# Patient Record
Sex: FEMALE | Race: WHITE | URBAN - METROPOLITAN AREA
[De-identification: names, ages, dates, MRNs, and addresses within clinical notes are randomized per-mention and may not be internally consistent; named-entity substitution may affect disease eponyms.]

---

## 2017-03-21 ENCOUNTER — IMPORTED ENCOUNTER (OUTPATIENT)
Dept: URBAN - METROPOLITAN AREA CLINIC 1 | Facility: CLINIC | Age: 67
End: 2017-03-21

## 2017-03-21 PROBLEM — H25.813: Noted: 2017-03-21

## 2017-03-21 PROBLEM — H04.123: Noted: 2017-03-21

## 2017-03-21 PROBLEM — E11.9: Noted: 2017-03-21

## 2017-03-21 PROBLEM — H16.143: Noted: 2017-03-21

## 2017-03-21 PROCEDURE — 92015 DETERMINE REFRACTIVE STATE: CPT

## 2017-03-21 PROCEDURE — 92014 COMPRE OPH EXAM EST PT 1/>: CPT

## 2017-03-21 NOTE — PATIENT DISCUSSION
1.  DM Type II without sign of diabetic retinopathy and no blot heme on dilated retinal examination today OU No Macular Edema: Stable. Discussed the pathophysiology of diabetes and its effect on the eye and risk of blindness. Stressed the importance of strong glucose control. Advised of importance of at least yearly dilated examinations but to contact us immediately for any problems or concerns. 2. Cataract OU: Observe for now without intervention. The patient was advised to contact us if any change or worsening of vision3. JOSELUIS w/ PEK OU- The increase of artificial tears OU to TID were recommended. 4.  H/o allergic conjunctivitis OU- Controlled on Zaditor OU PRN. 5. Return for an appointment for a 27 in 1 year with Dr. Sanjay Jay.

## 2018-03-27 ENCOUNTER — IMPORTED ENCOUNTER (OUTPATIENT)
Dept: URBAN - METROPOLITAN AREA CLINIC 1 | Facility: CLINIC | Age: 68
End: 2018-03-27

## 2018-03-27 PROBLEM — H04.123: Noted: 2018-03-27

## 2018-03-27 PROBLEM — R73.09: Noted: 2018-03-27

## 2018-03-27 PROBLEM — H25.813: Noted: 2018-03-27

## 2018-03-27 PROBLEM — H16.143: Noted: 2018-03-27

## 2018-03-27 PROBLEM — H43.811: Noted: 2018-03-27

## 2018-03-27 PROCEDURE — 92014 COMPRE OPH EXAM EST PT 1/>: CPT

## 2018-03-27 NOTE — PATIENT DISCUSSION
1.  DM Type II (Diet Controlled) without sign of diabetic retinopathy and no blot heme on dilated retinal examination today OU No Macular Edema:  Discussed the pathophysiology of diabetes and its effect on the eye and risk of blindness. Stressed the importance of strong glucose control. Advised of importance of at least yearly dilated examinations but to contact us immediately for any problems or concerns. 2. JOSELUIS w/ PEK OU- Restart ATs TID OU Routinely. 3.  PVD w/o Tear OD - RD precautions. 4.  Cataract OU: Observe for now without intervention. The patient was advised to contact us if any change or worsening of vision5. H/o Allergic Conjunctivitis- controlled on Zaditor BID OU. Letter to [address] 53 for an appointment in 1 yr 27 with Dr. Benny Mcclure.

## 2019-03-28 ENCOUNTER — IMPORTED ENCOUNTER (OUTPATIENT)
Dept: URBAN - METROPOLITAN AREA CLINIC 1 | Facility: CLINIC | Age: 69
End: 2019-03-28

## 2019-03-28 PROBLEM — R73.09: Noted: 2019-03-28

## 2019-03-28 PROBLEM — H16.143: Noted: 2019-03-28

## 2019-03-28 PROBLEM — H04.123: Noted: 2019-03-28

## 2019-03-28 PROCEDURE — 92014 COMPRE OPH EXAM EST PT 1/>: CPT

## 2020-03-26 ENCOUNTER — IMPORTED ENCOUNTER (OUTPATIENT)
Dept: URBAN - METROPOLITAN AREA CLINIC 1 | Facility: CLINIC | Age: 70
End: 2020-03-26

## 2020-03-26 PROBLEM — R73.09: Noted: 2020-03-26

## 2020-03-26 PROBLEM — H25.813: Noted: 2020-03-26

## 2020-03-26 PROBLEM — H16.143: Noted: 2020-03-26

## 2020-03-26 PROBLEM — H04.123: Noted: 2020-03-26

## 2020-03-26 PROCEDURE — 92015 DETERMINE REFRACTIVE STATE: CPT

## 2020-03-26 PROCEDURE — 92014 COMPRE OPH EXAM EST PT 1/>: CPT

## 2020-03-26 NOTE — PATIENT DISCUSSION
1.  DM Type II (Diet Controlled) without sign of diabetic retinopathy and no blot heme on dilated retinal examination today OU No Macular Edema:  Discussed the pathophysiology of diabetes and its effect on the eye and risk of blindness. Stressed the importance of strong glucose control. Advised of importance of at least yearly dilated examinations but to contact us immediately for any problems or concerns. 2. JOSELUIS w/ PEK OU -- Increase ATs to QID OU Routinely. 3.  PVD w/o Tear OU -- RD precautions. 4.  Cataract OU -- Observe for now without intervention. The patient was advised to contact us if any change or worsening of vision5. H/o Allergic Conjunctivitis - Controlled on Zaditor BID OU. Finalized [facility] MRx today. Return for an appointment in 1 year for a 30/glare with Dr. Laly Gustafson.

## 2020-05-22 ENCOUNTER — IMPORTED ENCOUNTER (OUTPATIENT)
Dept: URBAN - METROPOLITAN AREA CLINIC 1 | Facility: CLINIC | Age: 70
End: 2020-05-22

## 2020-05-22 PROBLEM — G51.0: Noted: 2020-05-22

## 2020-05-22 PROBLEM — H16.143: Noted: 2020-05-22

## 2020-05-22 PROBLEM — H04.123: Noted: 2020-05-22

## 2020-05-22 PROCEDURE — 92012 INTRM OPH EXAM EST PATIENT: CPT

## 2020-05-22 NOTE — PATIENT DISCUSSION
1.  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 Patient also c/o facial and tongue neuropathy symptoms. Patient to follow up with PCP. Consider Neurology Evaluation. 2. JOSELUIS w/ PEK OU-Use preservative free Tears q1H ou and OTC nola qhs ou. Ok to patch while sleeping. 3. Return for an appointment for 1 week 10 with Dr. Cally Cronin.

## 2020-05-22 NOTE — PATIENT DISCUSSION
Bell's Palsy OS -- the patient has signs and symptoms consistent with a right Bell's palsy. The condition and possible etiologies were discussed with the patient.

## 2020-05-29 ENCOUNTER — IMPORTED ENCOUNTER (OUTPATIENT)
Dept: URBAN - METROPOLITAN AREA CLINIC 1 | Facility: CLINIC | Age: 70
End: 2020-05-29

## 2020-05-29 PROBLEM — H04.123: Noted: 2020-05-29

## 2020-05-29 PROBLEM — H16.143: Noted: 2020-05-29

## 2020-05-29 PROBLEM — G51.0: Noted: 2020-05-29

## 2020-05-29 PROCEDURE — 92012 INTRM OPH EXAM EST PATIENT: CPT

## 2020-05-29 NOTE — PATIENT DISCUSSION
1.  Mild [place] Palsy OS -- Lid function improved over past week. Exposure keratopathy improved over past week with ATs q1H. Cont ATs q1H OU. Neurological work-up pending including MRI. 2. JOSELUIS w/ PEK OU -- Cont preservative free Tears q1H OU and OTC nola qhs OU. Ok to patch while sleeping. Return for an appointment in 1 month 10/ k check with Dr. Denise Martinez.

## 2020-06-29 ENCOUNTER — IMPORTED ENCOUNTER (OUTPATIENT)
Dept: URBAN - METROPOLITAN AREA CLINIC 1 | Facility: CLINIC | Age: 70
End: 2020-06-29

## 2020-06-29 PROCEDURE — 92012 INTRM OPH EXAM EST PATIENT: CPT

## 2020-06-29 NOTE — PATIENT DISCUSSION
1.  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 PEK improved with ATs q1H. Cont ATs q1H OS and nola QHS. MRI results pending. 2.  JOSELUIS w/ PEK OU -- Cont preservative free Tears routinely and OTC nola qhs OU. 3.  Cataract OU -- Observe for now without intervention. The patient was advised to contact us if any change or worsening of vision4. Allergic Conjunctivitis - Controlled on Zaditor BID OU. 5.  PVD w/o Tear OU -- RD precautions. 6.  H/o DM Type II (Diet Controlled) w/o Crystal for an appointment in As scheduled March with Dr. Tara Warren.

## 2021-03-26 ENCOUNTER — IMPORTED ENCOUNTER (OUTPATIENT)
Dept: URBAN - METROPOLITAN AREA CLINIC 1 | Facility: CLINIC | Age: 71
End: 2021-03-26

## 2021-03-26 PROBLEM — H25.813: Noted: 2021-03-26

## 2021-03-26 PROBLEM — R73.09: Noted: 2021-03-26

## 2021-03-26 PROBLEM — H16.143: Noted: 2021-03-26

## 2021-03-26 PROBLEM — H04.123: Noted: 2021-03-26

## 2021-03-26 PROCEDURE — 99214 OFFICE O/P EST MOD 30 MIN: CPT

## 2021-03-26 NOTE — PATIENT DISCUSSION
1.  DM Type II (Diet Controlled) without sign of diabetic retinopathy and no blot heme on dilated retinal examination today OU No Macular Edema:  Discussed the pathophysiology of diabetes and its effect on the eye and risk of blindness. Stressed the importance of strong glucose control. Advised of importance of at least yearly dilated examinations but to contact us immediately for any problems or concerns. 2. Cataract OU -- Observe for now without intervention. The patient was advised to contact us if any change or worsening of vision. 3. JOSELUIS w/ PEK OU -- Recommend ATs TID OU routinely and OTC nola QHS OU (handout given). 4.  Allergic Conjunctivitis - Controlled on Zaditor BID OU. 5.  PVD w/o Tear OU -- RD precautions. 6.  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 Cont ATs Q1Hr OS and nola QHS. Patient deferred Man[facility] Rx today. Return for an appointment in 6 months 10/k check/glare with Dr. Sara Meza.

## 2021-03-26 NOTE — PATIENT DISCUSSION
Cataract OU: Observe for now without intervention.  The patient was advised to contact us if any change or worsening of vision No

## 2021-05-14 ENCOUNTER — IMPORTED ENCOUNTER (OUTPATIENT)
Dept: URBAN - METROPOLITAN AREA CLINIC 1 | Facility: CLINIC | Age: 71
End: 2021-05-14

## 2021-05-14 PROBLEM — H25.813: Noted: 2021-05-14

## 2021-05-14 PROBLEM — R73.09: Noted: 2021-05-14

## 2021-05-14 PROCEDURE — 99214 OFFICE O/P EST MOD 30 MIN: CPT

## 2021-05-14 PROCEDURE — 92015 DETERMINE REFRACTIVE STATE: CPT

## 2021-05-14 NOTE — PATIENT DISCUSSION
1.  Cataract OU:  Visually Significant secondary to glare discussed the risks benefits alternatives and limitations of cataract surgery. The patient stated a full understanding and a desire to proceed with the procedure. The patient will need to return for preop appointment with cataract measurements and to have any additional questions answered and start pre-operative eye drops as directed. Phaco PCL OS then OD Otherwise follow-up as scheduled 2. DM Type II (Diet Controlled) without sign of diabetic retinopathy and no blot heme on dilated retinal examination today OU No Macular Edema:  Discussed the pathophysiology of diabetes and its effect on the eye and risk of blindness. Stressed the importance of strong glucose control. Advised of importance of at least yearly dilated examinations but to contact us immediately for any problems or concerns. 3. JOSELUIS w/ PEK OU - Begin Restasis BID OU (erx). Recommend ATs TID OU routinely and OTC nola QHS OU4. Allergic Conjunctivitis - Controlled on Zaditor BID OU. 5.  PVD w/o Tear OU - RD precautions. 6.  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 Return for an appointment for Ascan/H and P. with Dr. Rodney Oneill.

## 2021-05-28 ENCOUNTER — IMPORTED ENCOUNTER (OUTPATIENT)
Dept: URBAN - METROPOLITAN AREA CLINIC 1 | Facility: CLINIC | Age: 71
End: 2021-05-28

## 2021-05-28 PROBLEM — H25.812: Noted: 2021-05-28

## 2021-05-28 PROCEDURE — 92136 OPHTHALMIC BIOMETRY: CPT

## 2021-05-28 NOTE — PATIENT DISCUSSION
1. Cataract OS -- Visually Significant secondary to glare discussed the risks benefits alternatives and limitations of cataract surgery. The patient stated a full understanding and a desire to proceed with the procedure. Discussed with patient if PO Gtts are more than $120 for all three combined when filling at their Pharmacy please call our office to request generic substitutions. Pt came to pre-op appointment today alone and Lifestyle Questionnaire Completed. Pt understands they will need glasses post-op to achieve their best corrected vision.  Phaco PCL OS

## 2021-06-02 ENCOUNTER — IMPORTED ENCOUNTER (OUTPATIENT)
Dept: URBAN - METROPOLITAN AREA CLINIC 1 | Facility: CLINIC | Age: 71
End: 2021-06-02

## 2021-06-03 ENCOUNTER — IMPORTED ENCOUNTER (OUTPATIENT)
Dept: URBAN - METROPOLITAN AREA CLINIC 1 | Facility: CLINIC | Age: 71
End: 2021-06-03

## 2021-06-03 PROBLEM — Z96.1: Noted: 2021-06-03

## 2021-06-03 PROCEDURE — 99024 POSTOP FOLLOW-UP VISIT: CPT

## 2021-06-03 NOTE — PATIENT DISCUSSION
POD#1 CE/IOL Standard OS doing well. (Tatiana Hidalgo in place) Use Prednisolone BID OS Prolensa Qdaily OS Ocuflox TID OS: Use all three gtts through completion of PO gtt chart regimen/ Per our instructions given to patient.   Post op Warnings Reiterated RTC as scheduled

## 2021-06-11 ENCOUNTER — IMPORTED ENCOUNTER (OUTPATIENT)
Dept: URBAN - METROPOLITAN AREA CLINIC 1 | Facility: CLINIC | Age: 71
End: 2021-06-11

## 2021-06-11 PROBLEM — H25.811: Noted: 2021-06-11

## 2021-06-11 PROCEDURE — 92136 OPHTHALMIC BIOMETRY: CPT

## 2021-06-11 NOTE — PATIENT DISCUSSION
1.  Cataract OD: Visually Significant secondary to glare discussed the risks benefits alternatives and limitations of cataract surgery. The patient stated a full understanding and a desire to proceed with the procedure. Discussed with patient if PO Gtts are more than $120 for all three combined when filling at their Pharmacy please call our office to request generic substitutions. Pt understands they will need glasses post-op to achieve their best corrected vision. Phaco PCL OD 2. POW#3  CE/IOL Standard OS doing well. *Dextenza in place  Use Prednisolone BID OS && Prolensa Qdaily OS: Use through completion of po gtt regimen.  F/u as scheduled 2nd eye

## 2021-06-18 ENCOUNTER — IMPORTED ENCOUNTER (OUTPATIENT)
Dept: URBAN - METROPOLITAN AREA CLINIC 1 | Facility: CLINIC | Age: 71
End: 2021-06-18

## 2021-06-19 ENCOUNTER — IMPORTED ENCOUNTER (OUTPATIENT)
Dept: URBAN - METROPOLITAN AREA CLINIC 1 | Facility: CLINIC | Age: 71
End: 2021-06-19

## 2021-06-19 PROBLEM — Z96.1: Noted: 2021-06-19

## 2021-06-19 PROCEDURE — 99024 POSTOP FOLLOW-UP VISIT: CPT

## 2021-06-19 NOTE — PATIENT DISCUSSION
1. POD#1 Phaco/ PCL Standard OD- doing well. Dextenza Use Prednisolone BID OD Prolensa Qdaily OD Ocuflox TID OD : Use all three gtts through completion of PO gtt chart regimen/ Per our instructions given. Post op Warnings Reiterated 2. POW#3 Phaco/ PCL Standard OS- doing well. Dextenza  Use Prednisolone BID OS && Prolensa Qdaily OS: Use through completion of po gtt regimen.  RTC as scheduled

## 2021-07-13 ENCOUNTER — IMPORTED ENCOUNTER (OUTPATIENT)
Dept: URBAN - METROPOLITAN AREA CLINIC 1 | Facility: CLINIC | Age: 71
End: 2021-07-13

## 2021-07-13 PROBLEM — Z96.1: Noted: 2021-07-13

## 2021-07-13 PROCEDURE — 99024 POSTOP FOLLOW-UP VISIT: CPT

## 2021-07-13 NOTE — PATIENT DISCUSSION
POM#1 CE/IOL OU (Standard OU) doing well. *Dextenza in place  Use Prednisolone BID OD & Prolensa Qdaily OD: Use through completion of po gtt regimen. MRX for glasses given. Return for an appointment in February 30 with Dr. Laly Gustafson.

## 2021-11-29 ENCOUNTER — IMPORTED ENCOUNTER (OUTPATIENT)
Dept: URBAN - METROPOLITAN AREA CLINIC 1 | Facility: CLINIC | Age: 71
End: 2021-11-29

## 2021-11-29 PROBLEM — H43.813: Noted: 2021-11-29

## 2021-11-29 PROBLEM — H16.143: Noted: 2021-11-29

## 2021-11-29 PROBLEM — E11.9: Noted: 2021-11-29

## 2021-11-29 PROBLEM — H04.123: Noted: 2021-11-29

## 2021-11-29 PROBLEM — Z79.84: Noted: 2021-11-29

## 2021-11-29 PROCEDURE — 99214 OFFICE O/P EST MOD 30 MIN: CPT

## 2021-11-29 PROCEDURE — 92015 DETERMINE REFRACTIVE STATE: CPT

## 2021-11-29 NOTE — PATIENT DISCUSSION
1.  DM Type II (oral meds) without sign of diabetic retinopathy and no blot heme on dilated retinal examination today OU No Macular Edema:  Discussed the pathophysiology of diabetes and its effect on the eye and risk of blindness. Stressed the importance of strong glucose control. Advised of importance of at least yearly dilated examinations but to contact us immediately for any problems or concerns. Explained to PT that the vision changes could be due to PT recently starting Metformin and her BS decreasing. 2. JOSELUIS w/ PEK OU - Recommend the use of ATs TID OU routinely. Cont Restasis BID OU. 3. PVD w/o Tear OU - stable/observe. MRX given. Return for an appointment for Return as scheduled with Dr. Connor Benítez.

## 2022-02-15 ENCOUNTER — COMPREHENSIVE EXAM (OUTPATIENT)
Dept: URBAN - METROPOLITAN AREA CLINIC 1 | Facility: CLINIC | Age: 72
End: 2022-02-15

## 2022-02-15 DIAGNOSIS — Z96.1: ICD-10-CM

## 2022-02-15 DIAGNOSIS — E11.9: ICD-10-CM

## 2022-02-15 DIAGNOSIS — H16.143: ICD-10-CM

## 2022-02-15 DIAGNOSIS — H26.493: ICD-10-CM

## 2022-02-15 DIAGNOSIS — H04.123: ICD-10-CM

## 2022-02-15 DIAGNOSIS — H43.813: ICD-10-CM

## 2022-02-15 PROCEDURE — 92015 DETERMINE REFRACTIVE STATE: CPT

## 2022-02-15 PROCEDURE — 92014 COMPRE OPH EXAM EST PT 1/>: CPT

## 2022-02-15 RX ORDER — CYCLOSPORINE 0.5 MG/ML: 1 EMULSION OPHTHALMIC TWICE A DAY

## 2022-02-15 ASSESSMENT — TONOMETRY
OS_IOP_MMHG: 12
OD_IOP_MMHG: 12

## 2022-02-15 ASSESSMENT — VISUAL ACUITY
OD_BAT: 20/60
OS_BAT: 20/60
OD_SC: 20/30
OS_SC: 20/25

## 2022-03-04 ENCOUNTER — CLINIC PROCEDURE ONLY (OUTPATIENT)
Dept: URBAN - METROPOLITAN AREA CLINIC 1 | Facility: CLINIC | Age: 72
End: 2022-03-04

## 2022-03-04 DIAGNOSIS — H26.491: ICD-10-CM

## 2022-03-04 PROCEDURE — 66821 AFTER CATARACT LASER SURGERY: CPT

## 2022-03-04 NOTE — PROCEDURE NOTE: CLINICAL
PROCEDURE NOTE: YAG Capsulotomy OD. Diagnosis: Posterior Capsular Opacity. Anesthesia: Topical. The purpose and nature of the procedure, possible alternative methods of treatment, the risks involved and the possibility of complications were discussed with patient. The Patient wishes to proceed and the consent was signed. 1 gtt Prolensa applied. The laser was then performed under topical anesthesia with no complications. Post op instructions were given to patient as well as a follow-up appointment. Patient was advised to call our office if any questions or concerns. Valente Estrella

## 2022-03-18 ENCOUNTER — CLINIC PROCEDURE ONLY (OUTPATIENT)
Dept: URBAN - METROPOLITAN AREA CLINIC 1 | Facility: CLINIC | Age: 72
End: 2022-03-18

## 2022-03-18 DIAGNOSIS — H26.492: ICD-10-CM

## 2022-03-18 PROCEDURE — 66821 AFTER CATARACT LASER SURGERY: CPT

## 2022-03-18 NOTE — PROCEDURE NOTE: CLINICAL
PROCEDURE NOTE: YAG Capsulotomy OS. Diagnosis: Other Secondary Cataract. Anesthesia: Topical. The purpose and nature of the procedure, possible alternative methods of treatment, the risks involved and the possibility of complications were discussed with patient. The Patient wishes to proceed and the consent was signed. 1 gtt Prolensa applied. The laser was then performed under topical anesthesia with no complications. Post op instructions were given to patient as well as a follow-up appointment. Patient was advised to call our office if any questions or concerns. Letha Palencia

## 2022-04-08 ASSESSMENT — VISUAL ACUITY
OD_PH: SC 20/30
OS_CC: 20/30
OD_CC: 20/20-2
OS_GLARE: 20/40
OS_CC: 20/25
OD_CC: 20/150
OS_CC: 20/20
OD_PH: SC 20/20
OS_CC: 20/20-1
OD_CC: 20/25-1
OD_CC: 20/20
OS_CC: 20/20
OD_CC: 20/100-2
OS_CC: 20/60
OS_CC: 20/25
OS_GLARE: 20/200
OD_GLARE: 20/40
OS_CC: 20/20-1
OS_GLARE: 20/40
OS_CC: 20/20
OS_SC: 20/20
OS_CC: 20/25
OS_SC: 20/30
OD_CC: 20/400
OD_CC: 20/30
OD_CC: 20/30
OD_GLARE: 20/400
OD_CC: 20/50
OD_CC: 20/50-1
OD_GLARE: 20/400
OS_CC: 20/25
OS_CC: 20/25
OD_CC: 20/50-2
OD_SC: 20/20
OS_CC: 20/20
OD_CC: 20/30
OD_GLARE: 20/50
OD_CC: 20/20
OS_CC: 20/30-2
OD_SC: 20/30

## 2022-04-08 ASSESSMENT — TONOMETRY
OD_IOP_MMHG: 14
OS_IOP_MMHG: 13
OD_IOP_MMHG: 15
OD_IOP_MMHG: 14
OS_IOP_MMHG: 14
OS_IOP_MMHG: 15
OD_IOP_MMHG: 14
OD_IOP_MMHG: 14
OS_IOP_MMHG: 15
OS_IOP_MMHG: 14
OD_IOP_MMHG: 15
OS_IOP_MMHG: 13
OS_IOP_MMHG: 14
OD_IOP_MMHG: 14
OS_IOP_MMHG: 14
OD_IOP_MMHG: 16
OD_IOP_MMHG: 13
OD_IOP_MMHG: 15
OD_IOP_MMHG: 13
OS_IOP_MMHG: 16
OS_IOP_MMHG: 15
OS_IOP_MMHG: 13
OS_IOP_MMHG: 16
OD_IOP_MMHG: 16
OS_IOP_MMHG: 15
OD_IOP_MMHG: 15
OS_IOP_MMHG: 13

## 2022-04-08 ASSESSMENT — KERATOMETRY
OD_K1POWER_DIOPTERS: 43.00
OS_K1POWER_DIOPTERS: 43.50
OD_AXISANGLE_DEGREES: 094
OD_AXISANGLE2_DEGREES: 004
OS_K2POWER_DIOPTERS: 44.25
OS_AXISANGLE_DEGREES: 056
OS_AXISANGLE2_DEGREES: 146
OD_K2POWER_DIOPTERS: 43.75

## 2022-04-29 ENCOUNTER — POST-OP (OUTPATIENT)
Dept: URBAN - METROPOLITAN AREA CLINIC 1 | Facility: CLINIC | Age: 72
End: 2022-04-29

## 2022-04-29 DIAGNOSIS — Z98.890: ICD-10-CM

## 2022-04-29 PROCEDURE — 99024 POSTOP FOLLOW-UP VISIT: CPT

## 2022-04-29 ASSESSMENT — VISUAL ACUITY
OS_SC: 20/30 +1
OD_SC: 20/25 -1

## 2022-04-29 ASSESSMENT — TONOMETRY
OS_IOP_MMHG: 13
OD_IOP_MMHG: 13

## 2022-04-29 NOTE — PATIENT DISCUSSION
PEK much improved OU since restarting Restasis and using ATs. Continue Restasis BID OU and Recommend PF ATs QID-Q2H OU routinely.

## 2022-11-01 ENCOUNTER — FOLLOW UP (OUTPATIENT)
Dept: URBAN - METROPOLITAN AREA CLINIC 1 | Facility: CLINIC | Age: 72
End: 2022-11-01

## 2022-11-01 DIAGNOSIS — H04.123: ICD-10-CM

## 2022-11-01 DIAGNOSIS — H16.143: ICD-10-CM

## 2022-11-01 DIAGNOSIS — E11.9: ICD-10-CM

## 2022-11-01 PROCEDURE — 99213 OFFICE O/P EST LOW 20 MIN: CPT

## 2022-11-01 ASSESSMENT — VISUAL ACUITY
OD_SC: 20/25-2
OS_SC: 20/20

## 2022-11-01 ASSESSMENT — TONOMETRY
OS_IOP_MMHG: 13
OD_IOP_MMHG: 13

## 2022-11-01 NOTE — PATIENT DISCUSSION
PEK much improved OU despite being off of Restasis x 1 month but using ATs. Restart Restasis BID OU and Recommend PF ATs QID-Q2H OU routinely.

## 2023-05-04 ENCOUNTER — COMPREHENSIVE EXAM (OUTPATIENT)
Dept: URBAN - METROPOLITAN AREA CLINIC 1 | Facility: CLINIC | Age: 73
End: 2023-05-04

## 2023-05-04 DIAGNOSIS — Z96.1: ICD-10-CM

## 2023-05-04 DIAGNOSIS — H16.143: ICD-10-CM

## 2023-05-04 DIAGNOSIS — E11.9: ICD-10-CM

## 2023-05-04 DIAGNOSIS — H43.813: ICD-10-CM

## 2023-05-04 DIAGNOSIS — H04.123: ICD-10-CM

## 2023-05-04 PROCEDURE — 99214 OFFICE O/P EST MOD 30 MIN: CPT

## 2023-05-04 ASSESSMENT — TONOMETRY
OS_IOP_MMHG: 11
OD_IOP_MMHG: 11

## 2023-05-04 ASSESSMENT — VISUAL ACUITY
OD_CC: 20/20
OD_CC: J1+
OS_CC: 20/20
OS_CC: J1+
OD_SC: 20/30
OS_SC: 20/25-1

## 2023-11-06 ENCOUNTER — FOLLOW UP (OUTPATIENT)
Dept: URBAN - METROPOLITAN AREA CLINIC 1 | Facility: CLINIC | Age: 73
End: 2023-11-06

## 2023-11-06 DIAGNOSIS — H16.143: ICD-10-CM

## 2023-11-06 DIAGNOSIS — H04.123: ICD-10-CM

## 2023-11-06 PROCEDURE — 99213 OFFICE O/P EST LOW 20 MIN: CPT

## 2023-11-06 ASSESSMENT — TONOMETRY
OS_IOP_MMHG: 11
OD_IOP_MMHG: 11

## 2023-11-06 ASSESSMENT — VISUAL ACUITY
OS_SC: 20/25-1
OD_SC: J3
OS_SC: J3
OD_SC: 20/40

## 2024-01-11 NOTE — PATIENT DISCUSSION
Discussed the importance of blood sugar control in the prevention of ocular complications. Normal for race

## 2024-07-29 ENCOUNTER — COMPREHENSIVE EXAM (OUTPATIENT)
Dept: URBAN - METROPOLITAN AREA CLINIC 1 | Facility: CLINIC | Age: 74
End: 2024-07-29

## 2024-07-29 DIAGNOSIS — H01.024: ICD-10-CM

## 2024-07-29 DIAGNOSIS — H43.813: ICD-10-CM

## 2024-07-29 DIAGNOSIS — H16.143: ICD-10-CM

## 2024-07-29 DIAGNOSIS — Z96.1: ICD-10-CM

## 2024-07-29 DIAGNOSIS — H04.123: ICD-10-CM

## 2024-07-29 DIAGNOSIS — H01.021: ICD-10-CM

## 2024-07-29 DIAGNOSIS — E11.9: ICD-10-CM

## 2024-07-29 PROCEDURE — 99214 OFFICE O/P EST MOD 30 MIN: CPT

## 2024-07-29 ASSESSMENT — VISUAL ACUITY
OU_SC: 20/40
OS_CC: 20/20
OD_CC: 20/20

## 2024-07-29 ASSESSMENT — TONOMETRY
OS_IOP_MMHG: 12
OD_IOP_MMHG: 12

## 2025-01-23 ENCOUNTER — FOLLOW UP (OUTPATIENT)
Age: 75
End: 2025-01-23

## 2025-01-23 DIAGNOSIS — H16.143: ICD-10-CM

## 2025-01-23 DIAGNOSIS — H04.123: ICD-10-CM

## 2025-01-23 DIAGNOSIS — Z96.1: ICD-10-CM

## 2025-01-23 PROCEDURE — 92015 DETERMINE REFRACTIVE STATE: CPT

## 2025-01-23 PROCEDURE — 99213 OFFICE O/P EST LOW 20 MIN: CPT

## 2025-07-28 ENCOUNTER — COMPREHENSIVE EXAM (OUTPATIENT)
Age: 75
End: 2025-07-28

## 2025-07-28 DIAGNOSIS — E11.9: ICD-10-CM

## 2025-07-28 DIAGNOSIS — H04.123: ICD-10-CM

## 2025-07-28 PROCEDURE — 99214 OFFICE O/P EST MOD 30 MIN: CPT
